# Patient Record
Sex: FEMALE | Race: WHITE | NOT HISPANIC OR LATINO | URBAN - METROPOLITAN AREA
[De-identification: names, ages, dates, MRNs, and addresses within clinical notes are randomized per-mention and may not be internally consistent; named-entity substitution may affect disease eponyms.]

---

## 2021-05-17 ENCOUNTER — OFFICE VISIT - HEALTHEAST (OUTPATIENT)
Dept: FAMILY MEDICINE | Facility: CLINIC | Age: 61
End: 2021-05-17

## 2021-05-17 DIAGNOSIS — L97.911 ULCER OF RIGHT LOWER EXTREMITY, LIMITED TO BREAKDOWN OF SKIN (H): ICD-10-CM

## 2021-05-17 DIAGNOSIS — L03.115 CELLULITIS OF RIGHT LOWER EXTREMITY: ICD-10-CM

## 2021-05-17 DIAGNOSIS — E66.01 MORBID OBESITY (H): ICD-10-CM

## 2021-05-17 ASSESSMENT — MIFFLIN-ST. JEOR: SCORE: 2009.93

## 2021-05-25 ENCOUNTER — RECORDS - HEALTHEAST (OUTPATIENT)
Dept: FAMILY MEDICINE | Facility: CLINIC | Age: 61
End: 2021-05-25

## 2021-05-25 ENCOUNTER — RECORDS - HEALTHEAST (OUTPATIENT)
Dept: ADMINISTRATIVE | Facility: CLINIC | Age: 61
End: 2021-05-25

## 2021-05-27 VITALS
SYSTOLIC BLOOD PRESSURE: 136 MMHG | DIASTOLIC BLOOD PRESSURE: 88 MMHG | WEIGHT: 293 LBS | HEIGHT: 64 IN | BODY MASS INDEX: 50.02 KG/M2 | HEART RATE: 100 BPM

## 2021-05-30 ENCOUNTER — RECORDS - HEALTHEAST (OUTPATIENT)
Dept: ADMINISTRATIVE | Facility: CLINIC | Age: 61
End: 2021-05-30

## 2021-06-16 PROBLEM — E66.01 MORBID OBESITY (H): Status: ACTIVE | Noted: 2021-05-17

## 2021-06-17 NOTE — PROGRESS NOTES
Subjective:      Shayla Miguel is a 61 y.o. female with chief complaint of lower extremity cellulitis.  She developed cellulitis in the right lower extremity a few weeks ago.  I reviewed emergency room report from 5/3/2021.  I also reviewed BMP and hemogram ordered at that visit.  They provided her with oral Keflex and asked her to follow-up with PCP.  She was seen again at urgent care on 5/6/2021.  Reviewed that note today.  They felt cellulitis was improving slowly.  She was given a Rocephin shot, and asked to follow-up with us.  Patient reports symptoms are slowly improving.  She feels like infection is getting better.  However she has developed some ulcers on the back of her right leg.  She has finished all of her antibiotics.  No fevers.  She says the entire lower leg was quite painful for a long time.  However as infection has improved most of the pain has gone.  She continues to still have some pain at the posterior calf where the site of the ulcers is located.  She is able to elevate her feet a lot.  She has a daughter who is helping her change wound dressings.    Of note, she did have elevated random glucose check at the emergency room.  Believes she did get her Covid vaccines, though I did not write down the dates for her shots.    Patient Active Problem List   Diagnosis     Obesity     Morbid obesity (H)     No current outpatient medications on file.      Tobacco Use      Smoking status: Never Smoker      Smokeless tobacco: Never Used       Objective:     Allergies:  Patient has no known allergies.    Vitals:  Vitals:    05/17/21 1403   BP: 136/88   Pulse: 100     Body mass index is 55.94 kg/m .    Vital signs reviewed.  General: Patient is alert and oriented x 3, in no apparent distress  Cardiac: regular rate and rhythm, no murmurs  Pulmonary: lungs clear to auscultation bilaterally, no crackles, rales, rhonchi, or wheezing noted  Skin: Right lower extremity has mild erythema and some dry flaky  skin, starting below the knee and then extending down toward the proximal foot, toes appear healthy and normal, full active range of motion, this appears to be resolving cellulitis  She does continue to have 1+ pitting edema in the right lower extremity,  Posterior calf on the right side does have a cluster of superficial ulcers.  Some serous fluid draining, skin around this ulcer area is slightly more erythematous and indurated    Wound care   I undressed wound and examined skin as above.  I then replaced covering with [employer] no stick gauze, some Band-Aids, a gauze wrap, and then an Ace wrap over top of that.  She was sent home with a second Ace wrap to use as needed.  Ace wrap for 4 inches, gauze roll was 4 inches.        Assessment and Plan:   1. Ulcer of right lower extremity, limited to breakdown of skin (H)  Ulcers seem to be slowly healing, per patient's report.  No acute concerns of ongoing infection at this time.  I think she would benefit from wound care.  Urgent referral placed today.  We reviewed appropriate dressing care in the meantime.  We discussed reasons to notify us if symptoms or leg is worsening.  - Ambulatory referral to [facility]    2. Cellulitis of right lower extremity  She has finished all of her medication.  Cellulitis appears to be resolving.  She does have some sequelae, ulcers, see #1.  - Ambulatory referral to [facility]    3. Morbid obesity (H)  She has been without up with PCP for over a year, in part due to pandemic and [facility] closing.  She plans to establish care with PCP within the next few months.      This dictation uses voice recognition software, which may contain typographical errors.

## 2021-06-17 NOTE — TELEPHONE ENCOUNTER
Please call patient and see how her leg wound is doing.  Hopefully it is continuing to heal.    Has she been in to see wound care yet?

## 2021-06-25 NOTE — TELEPHONE ENCOUNTER
Left message for patient to return call to see how she is doing and if shes been able to get in to wound care  Sarai Smith MA

## 2022-10-01 ENCOUNTER — HEALTH MAINTENANCE LETTER (OUTPATIENT)
Age: 62
End: 2022-10-01

## 2023-10-15 ENCOUNTER — HEALTH MAINTENANCE LETTER (OUTPATIENT)
Age: 63
End: 2023-10-15